# Patient Record
Sex: FEMALE | Race: OTHER
[De-identification: names, ages, dates, MRNs, and addresses within clinical notes are randomized per-mention and may not be internally consistent; named-entity substitution may affect disease eponyms.]

---

## 2020-06-23 ENCOUNTER — HOSPITAL ENCOUNTER (EMERGENCY)
Dept: HOSPITAL 72 - EMR | Age: 50
LOS: 1 days | Discharge: ANOTHER HOSPITAL | End: 2020-06-24
Payer: MEDICAID

## 2020-06-23 VITALS — WEIGHT: 160 LBS | BODY MASS INDEX: 26.66 KG/M2 | HEIGHT: 65 IN

## 2020-06-23 VITALS — DIASTOLIC BLOOD PRESSURE: 82 MMHG | SYSTOLIC BLOOD PRESSURE: 153 MMHG

## 2020-06-23 VITALS — SYSTOLIC BLOOD PRESSURE: 138 MMHG | DIASTOLIC BLOOD PRESSURE: 77 MMHG

## 2020-06-23 DIAGNOSIS — J18.9: Primary | ICD-10-CM

## 2020-06-23 DIAGNOSIS — U07.1: ICD-10-CM

## 2020-06-23 DIAGNOSIS — D72.810: ICD-10-CM

## 2020-06-23 DIAGNOSIS — R06.82: ICD-10-CM

## 2020-06-23 DIAGNOSIS — R94.5: ICD-10-CM

## 2020-06-23 LAB
ADD MANUAL DIFF: NO
ALBUMIN SERPL-MCNC: 2.9 G/DL (ref 3.4–5)
ALBUMIN/GLOB SERPL: 0.5 {RATIO} (ref 1–2.7)
ALP SERPL-CCNC: 287 U/L (ref 46–116)
ALT SERPL-CCNC: 114 U/L (ref 12–78)
ANION GAP SERPL CALC-SCNC: 12 MMOL/L (ref 5–15)
APPEARANCE UR: CLEAR
APTT BLD: 25 SEC (ref 23–33)
APTT PPP: YELLOW S
AST SERPL-CCNC: 134 U/L (ref 15–37)
BASOPHILS NFR BLD AUTO: 0.5 % (ref 0–2)
BILIRUB SERPL-MCNC: 0.8 MG/DL (ref 0.2–1)
BUN SERPL-MCNC: 11 MG/DL (ref 7–18)
CALCIUM SERPL-MCNC: 8.6 MG/DL (ref 8.5–10.1)
CHLORIDE SERPL-SCNC: 99 MMOL/L (ref 98–107)
CK SERPL-CCNC: 89 U/L (ref 26–308)
CO2 SERPL-SCNC: 27 MMOL/L (ref 21–32)
CREAT SERPL-MCNC: 0.8 MG/DL (ref 0.55–1.3)
EOSINOPHIL NFR BLD AUTO: 0.1 % (ref 0–3)
ERYTHROCYTE [DISTWIDTH] IN BLOOD BY AUTOMATED COUNT: 12.5 % (ref 11.6–14.8)
FERRITIN SERPL-MCNC: 238 NG/ML (ref 8–388)
GLOBULIN SER-MCNC: 5.5 G/DL
GLUCOSE UR STRIP-MCNC: NEGATIVE MG/DL
HCT VFR BLD CALC: 36.3 % (ref 37–47)
HGB BLD-MCNC: 12 G/DL (ref 12–16)
INR PPP: 1 (ref 0.9–1.1)
KETONES UR QL STRIP: (no result)
LDH SERPL L TO P-CCNC: 505 U/L (ref 81–234)
LEUKOCYTE ESTERASE UR QL STRIP: (no result)
LYMPHOCYTES NFR BLD AUTO: 19.1 % (ref 20–45)
MCV RBC AUTO: 92 FL (ref 80–99)
MONOCYTES NFR BLD AUTO: 4.5 % (ref 1–10)
NEUTROPHILS NFR BLD AUTO: 75.9 % (ref 45–75)
NITRITE UR QL STRIP: NEGATIVE
PH UR STRIP: 6.5 [PH] (ref 4.5–8)
PLATELET # BLD: 405 K/UL (ref 150–450)
POTASSIUM SERPL-SCNC: 4.9 MMOL/L (ref 3.5–5.1)
PROT UR QL STRIP: (no result)
RBC # BLD AUTO: 3.95 M/UL (ref 4.2–5.4)
SODIUM SERPL-SCNC: 138 MMOL/L (ref 136–145)
SP GR UR STRIP: 1.01 (ref 1–1.03)
UROBILINOGEN UR-MCNC: 8 MG/DL (ref 0–1)
WBC # BLD AUTO: 7.8 K/UL (ref 4.8–10.8)

## 2020-06-23 PROCEDURE — 86140 C-REACTIVE PROTEIN: CPT

## 2020-06-23 PROCEDURE — 85610 PROTHROMBIN TIME: CPT

## 2020-06-23 PROCEDURE — 80053 COMPREHEN METABOLIC PANEL: CPT

## 2020-06-23 PROCEDURE — 96365 THER/PROPH/DIAG IV INF INIT: CPT

## 2020-06-23 PROCEDURE — 83615 LACTATE (LD) (LDH) ENZYME: CPT

## 2020-06-23 PROCEDURE — 84484 ASSAY OF TROPONIN QUANT: CPT

## 2020-06-23 PROCEDURE — 85730 THROMBOPLASTIN TIME PARTIAL: CPT

## 2020-06-23 PROCEDURE — 87040 BLOOD CULTURE FOR BACTERIA: CPT

## 2020-06-23 PROCEDURE — 85025 COMPLETE CBC W/AUTO DIFF WBC: CPT

## 2020-06-23 PROCEDURE — 82728 ASSAY OF FERRITIN: CPT

## 2020-06-23 PROCEDURE — 99285 EMERGENCY DEPT VISIT HI MDM: CPT

## 2020-06-23 PROCEDURE — 85379 FIBRIN DEGRADATION QUANT: CPT

## 2020-06-23 PROCEDURE — 96367 TX/PROPH/DG ADDL SEQ IV INF: CPT

## 2020-06-23 PROCEDURE — 71045 X-RAY EXAM CHEST 1 VIEW: CPT

## 2020-06-23 PROCEDURE — 96361 HYDRATE IV INFUSION ADD-ON: CPT

## 2020-06-23 PROCEDURE — 36415 COLL VENOUS BLD VENIPUNCTURE: CPT

## 2020-06-23 PROCEDURE — 96375 TX/PRO/DX INJ NEW DRUG ADDON: CPT

## 2020-06-23 PROCEDURE — 93005 ELECTROCARDIOGRAM TRACING: CPT

## 2020-06-23 PROCEDURE — 83605 ASSAY OF LACTIC ACID: CPT

## 2020-06-23 PROCEDURE — 82550 ASSAY OF CK (CPK): CPT

## 2020-06-23 PROCEDURE — 96372 THER/PROPH/DIAG INJ SC/IM: CPT

## 2020-06-23 PROCEDURE — 81003 URINALYSIS AUTO W/O SCOPE: CPT

## 2020-06-23 PROCEDURE — 83880 ASSAY OF NATRIURETIC PEPTIDE: CPT

## 2020-06-23 NOTE — EMERGENCY ROOM REPORT
History of Present Illness


General


Chief Complaint:  Upper Respiratory Illness


Source:  Patient





Present Illness


HPI


Patient presents with 8 days of increasing dyspnea on exertion.  This morning 

it was more severe and lasted throughout the day even at rest.  She has a 

history of bronchitis.  She denies having fevers or chills or sore throat..  

There is been no vomiting or diarrhea.  She denies chest pain.  There is no 

calf pain or edema.  She is been self isolating and is uncertain whether she 

has been exposed to COVID-19 from other sources.  She does not smoke.





6 months ago,she was prescribed an inhaler which seemed to help her minimally.  

She says that there is a pill that her doctor prescribed for her but is not 

helping at this time.  She is not sure what that medication is.  





She denies diabetes high blood pressure heart disease but is overweight.





No palpitations, nausea, dysuria, abdominal pain, joint pain, rashes, depression

, anxiety, visual changes, dizziness, headache.


Allergies:  


Coded Allergies:  


     No Known Allergies (Unverified , 6/23/20)





COVID-19 Screening


Contact w/high risk pt:  No


Recent Travel to affected area:  No


Experienced COVID-19 symptoms?:  Yes


COVID-19 symptoms experienced:  Cough


COVID-19 Testing performed PTA:  No





Patient History


Past Medical History:  see triage record


Social History:  Denies: smoking, alcohol use, drug use


Social History Narrative


at home


Last Menstrual Period:  n/a


Reviewed Nursing Documentation:  PMH: Agreed; PSxH: Agreed





Review of Systems


All Other Systems:  negative except mentioned in HPI





Physical Exam





Vital Signs








  Date Time  Temp Pulse Resp B/P (MAP) Pulse Ox O2 Delivery O2 Flow Rate FiO2


 


6/23/20 21:27 98.8 76 18 148/77 (100) 96 Room Air  








Sp02 EP Interpretation:  reviewed, normal


General Appearance:  alert, GCS 15, non-toxic, mild distress


Head:  normocephalic


Eyes:  bilateral eye normal inspection, bilateral eye PERRL, bilateral eye EOMI


ENT:  moist mucus membranes


Neck:  supple


Respiratory:  lungs clear, normal breath sounds, other - Tachypnea


Cardiovascular #1:  regular rate, rhythm, no edema


Cardiovascular #2:  2+ radial (R)


Gastrointestinal:  normal inspection, normal bowel sounds, non tender, no mass, 

non-distended, overweight


Genitourinary:  no CVA tenderness


Musculoskeletal:  back normal, normal range of motion, no calf tenderness, gait/

station normal


Neurologic:  alert, oriented x3, grossly normal


Psychiatric:  depressed affect


Skin:  no rash, warm/dry





Procedures


Critical Care Time


Critical Care Time


Total Critical Care Time: 45 min bedside evaluation and treatment excludes 

procedures (EKG).


Reason for critical care: Dyspnea, high suspicion for COVID-19, discussions 

with transferring MD, family and transfer team


Possible complications: hypotension, hypertension, MI, shock, arrhythmias, 

metabolic acidosis, end organ damage, respiratory failure.


Interventions: Lovenox, azithromycin, Rocephin, dexamethasone, oxygen


Course: Patient presented with dyspnea.  Clinical constellation suggests COVID-

19.  Chest x-ray with bilateral infiltrates.  Lovenox, dexamethasone and 

azithromycin begun.  Discussion with patient and family.  Discussion with 

transferring MD who requested rapid COVID-19 testing.  Rapid COVID-19 test 

returns negative.  Due to the clinical constellation it is felt this is a false 

negative result.  Rocephin added.  Re-discussion with transferring MD and 

family.  Discussion with transferring EMTs for the use of proper PPE and 

transfer.  Patient improved with treatment and oxygen.


Consultations: nursing staff, EMS, family, accepting physician, transport team


Performed by: Dr. Stephen


Tolerated well condition = serious





Medical Decision Making


Diagnostic Impression:  


 Primary Impression:  


 Pneumonia


 Qualified Codes:  J18.9 - Pneumonia, unspecified organism


 Additional Impressions:  


 COVID-19


 Suspected COVID-19 virus infection


 Elevated LFTs


 Lymphopenia


ER Course


Patient presents with dyspnea on exertion.  Differential includes bronchospasm, 

acute myocardial infarction, bronchitis, COVID-19, pulmonary embolus and 

anxiety amongst others.  She is not hypoxemic at this time but she is has 

resting dyspnea.  Evaluation with EKG, chest x-ray and labs including possible 

COVID-19 reactive.  Consideration for observation in the hospital.  If patient 

deteriorates consider reconsider increasing oxygen and also proning.





EKG sinus rhythm without acute changes.  CXR bilateral infiltrates.  White 

count normal however there is a lymphopenia.  Normal d-dimer.  Elevated liver 

function tests.  C-reactive protein elevated.  Ferritin normal.





In this clinical setting, suspicion for COVID-19 pneumonia is high.





Patient not hypoxemic however with tachypnea and increased work with breathing.

  Lovenox, dexamethasone and azithromycin begun.





Discussed with children working diagnosis and plan for admission.





HMO requesting rapid COVID-19 test.





Rapid COVID-19 test returns negative.  However my suspicion is high.  I believe 

it is a false negative test.





Rocephin is added.  Discussed with patient's family possible transfer.





Discussed with Dr. Jeffries at [facility].  I discussed that 

clinically my suspicion for COVID-19 pneumonia is extremely high.  He 

understands this and accepts the patient to a telemetry unit in isolation.





Discussed the high suspicion for COVID-19 with transfer EMTs.  Ensured that 

they had proper protective equipment.

















Laboratory Tests








Test


  6/23/20


22:40


 


White Blood Count


  7.8 K/UL


(4.8-10.8)


 


Red Blood Count


  3.95 M/UL


(4.20-5.40)  L


 


Hemoglobin


  12.0 G/DL


(12.0-16.0)


 


Hematocrit


  36.3 %


(37.0-47.0)  L


 


Mean Corpuscular Volume 92 FL (80-99)  


 


Mean Corpuscular Hemoglobin


  30.5 PG


(27.0-31.0)


 


Mean Corpuscular Hemoglobin


Concent 33.2 G/DL


(32.0-36.0)


 


Red Cell Distribution Width


  12.5 %


(11.6-14.8)


 


Platelet Count


  405 K/UL


(150-450)


 


Mean Platelet Volume


  6.6 FL


(6.5-10.1)


 


Neutrophils (%) (Auto)


  75.9 %


(45.0-75.0)  H


 


Lymphocytes (%) (Auto)


  19.1 %


(20.0-45.0)  L


 


Monocytes (%) (Auto)


  4.5 %


(1.0-10.0)


 


Eosinophils (%) (Auto)


  0.1 %


(0.0-3.0)


 


Basophils (%) (Auto)


  0.5 %


(0.0-2.0)


 


Prothrombin Time


  10.6 SEC


(9.30-11.50)


 


Prothrombin Time INR 1.0 (0.9-1.1)  


 


Activated Partial


Thromboplast Time 25 SEC (23-33)


 


 


D-Dimer


  0.37 mg/L FEU


(0.00-0.49)


 


Urine Color Yellow  


 


Urine Appearance Clear  


 


Urine pH 6.5 (4.5-8.0)  


 


Urine Specific Gravity


  1.010


(1.005-1.035)


 


Urine Protein


  3+ (NEGATIVE)


H


 


Urine Glucose (UA)


  Negative


(NEGATIVE)


 


Urine Ketones


  1+ (NEGATIVE)


H


 


Urine Blood


  1+ (NEGATIVE)


H


 


Urine Nitrite


  Negative


(NEGATIVE)


 


Urine Bilirubin


  2+ (NEGATIVE)


H


 


Urine Ictotest


  Negative


(NEGATIVE)


 


Urine Urobilinogen


  8 MG/DL


(0.0-1.0)  H


 


Urine Leukocyte Esterase


  1+ (NEGATIVE)


H


 


Urine RBC


  2-4 /HPF (0 -


2)  H


 


Urine WBC


  2-4 /HPF (0 -


2)


 


Urine Squamous Epithelial


Cells Moderate /LPF


(NONE/OCC)  H


 


Urine Bacteria


  Few /HPF


(NONE)


 


Sodium Level


  138 MMOL/L


(136-145)


 


Potassium Level


  4.9 MMOL/L


(3.5-5.1)


 


Chloride Level


  99 MMOL/L


()


 


Carbon Dioxide Level


  27 MMOL/L


(21-32)


 


Anion Gap


  12 mmol/L


(5-15)


 


Blood Urea Nitrogen


  11 mg/dL


(7-18)


 


Creatinine


  0.8 MG/DL


(0.55-1.30)


 


Estimated Glomerular


Filtration Rate > 60 mL/min


(>60)


 


Glucose Level


  158 MG/DL


()  H


 


Lactic Acid Level


  1.70 mmol/L


(0.4-2.0)


 


Calcium Level


  8.6 MG/DL


(8.5-10.1)


 


Ferritin


  238 NG/ML


(8-388)


 


Total Bilirubin


  0.8 MG/DL


(0.2-1.0)


 


Aspartate Amino Transferase


(AST) 134 U/L


(15-37)  H


 


Alanine Aminotransferase (ALT)


  114 U/L


(12-78)  H


 


Alkaline Phosphatase


  287 U/L


()  H


 


Lactate Dehydrogenase


  505 U/L


()  H


 


Total Creatine Kinase


  89 U/L


()


 


Troponin I


  0.000 ng/mL


(0.000-0.056)


 


C-Reactive Protein,


Quantitative 8.9 mg/dL


(0.00-0.90)  H


 


Pro-B-Type Natriuretic Peptide


  59 pg/mL


(0-125)


 


Total Protein


  8.4 G/DL


(6.4-8.2)  H


 


Albumin


  2.9 G/DL


(3.4-5.0)  L


 


Globulin 5.5 g/dL  


 


Albumin/Globulin Ratio


  0.5 (1.0-2.7)


L








Microbiology








 Date/Time


Source Procedure


Growth Status


 


 


 6/24/20 00:45


Nasopharynx SARS-CoV-2 RdRp Gene Assay - Final Complete








EKG Diagnostic Results


Rate:  normal


Rhythm:  NSR


ST Segments:  no acute changes





Rhythm Strip Diag. Results


EP Interpretation:  yes


Rhythm:  NSR, no PVC's, no ectopy





Chest X-Ray Diagnostic Results


Chest X-Ray Diagnostic Results :  


   Chest X-Ray Ordered:  Yes


   # of Views/Limited/Complete:  1 View


   Indication:  Shortness of Breath


   EP Interpretation:  Yes


   Interpretation:  no effusion, no pneumothorax, other - bilateral infiltrates





Last Vital Signs








  Date Time  Temp Pulse Resp B/P (MAP) Pulse Ox O2 Delivery O2 Flow Rate FiO2


 


6/24/20 03:48 99.0 76 28 152/86 98 Nasal Cannula 1.0 








Status:  improved


Disposition:  SHORT-TERM HOSP


Condition:  Serious


Scripts


Unable to Obtain Active Prescriptions or Reported Meds











Armin Stephen MD Jun 23, 2020 21:58

## 2020-06-24 VITALS — SYSTOLIC BLOOD PRESSURE: 147 MMHG | DIASTOLIC BLOOD PRESSURE: 86 MMHG

## 2020-06-24 VITALS — SYSTOLIC BLOOD PRESSURE: 148 MMHG | DIASTOLIC BLOOD PRESSURE: 72 MMHG

## 2020-06-24 VITALS — SYSTOLIC BLOOD PRESSURE: 152 MMHG | DIASTOLIC BLOOD PRESSURE: 86 MMHG

## 2020-06-24 NOTE — DIAGNOSTIC IMAGING REPORT
Procedure: XRAY Chest 1v

Reason for study: Reason For Exam: [id number]

 

Comparison films:  None.

 

FINDINGS:

 

A single one view chest is obtained. Vascularity is normal. There are bilateral

infiltrates in the mid to lower lung zones. Cardiac and mediastinal silhouette are

within normal limits. CP angles are sharp.  The bony thorax appear unremarkable.

 

IMPRESSION:  

 

Bilateral infiltrates.